# Patient Record
Sex: MALE | Race: ASIAN | Employment: FULL TIME | URBAN - METROPOLITAN AREA
[De-identification: names, ages, dates, MRNs, and addresses within clinical notes are randomized per-mention and may not be internally consistent; named-entity substitution may affect disease eponyms.]

---

## 2020-06-17 ENCOUNTER — TELEPHONE (OUTPATIENT)
Dept: SURGERY | Facility: CLINIC | Age: 30
End: 2020-06-17

## 2020-06-17 NOTE — TELEPHONE ENCOUNTER
[facility] Call Center    Phone Message    May a detailed message be left on voicemail: yes     Reason for Call: Appointment Intake    Referring Provider Name: self referral   Diagnosis and/or Symptoms: Hemorrhoids - wants sooner than first available in August due to pain    Action Taken: Message routed to:  [facility] ([facility] M[facility]    Travel Screening: Not Applicable

## 2020-06-17 NOTE — TELEPHONE ENCOUNTER
Assessed symptoms per pt worsening burning pain 1 hour after BM. Told pt to drink plenty of fluids and start a daily fiber supplement. Reports no bleeding

## 2020-06-18 NOTE — TELEPHONE ENCOUNTER
RECORDS RECEIVED FROM: Self referral    DATE RECEIVED: 7/1/20 9:15AM   NOTES STATUS DETAILS   OFFICE NOTE from referring provider  N/A Self referral

## 2020-07-01 ENCOUNTER — PRE VISIT (OUTPATIENT)
Dept: SURGERY | Facility: CLINIC | Age: 30
End: 2020-07-01

## 2020-07-01 ENCOUNTER — OFFICE VISIT (OUTPATIENT)
Dept: SURGERY | Facility: CLINIC | Age: 30
End: 2020-07-01
Payer: COMMERCIAL

## 2020-07-01 VITALS
SYSTOLIC BLOOD PRESSURE: 159 MMHG | BODY MASS INDEX: 28.19 KG/M2 | RESPIRATION RATE: 18 BRPM | DIASTOLIC BLOOD PRESSURE: 103 MMHG | WEIGHT: 186 LBS | HEIGHT: 68 IN | OXYGEN SATURATION: 98 % | HEART RATE: 87 BPM | TEMPERATURE: 98.8 F

## 2020-07-01 DIAGNOSIS — K64.8 HEMORRHOID PROLAPSE: Primary | ICD-10-CM

## 2020-07-01 DIAGNOSIS — K59.09 OTHER CONSTIPATION: ICD-10-CM

## 2020-07-01 RX ORDER — ZINC GLUCONATE 50 MG
50 TABLET ORAL DAILY
COMMUNITY

## 2020-07-01 SDOH — HEALTH STABILITY: MENTAL HEALTH: HOW OFTEN DO YOU HAVE A DRINK CONTAINING ALCOHOL?: MONTHLY OR LESS

## 2020-07-01 ASSESSMENT — MIFFLIN-ST. JEOR: SCORE: 1778.19

## 2020-07-01 ASSESSMENT — PAIN SCALES - GENERAL: PAINLEVEL: NO PAIN (0)

## 2020-07-01 NOTE — PATIENT INSTRUCTIONS
1. Start a daily fiber supplement such as Citrucel or Metamucil. Start with once a day and slowly increase up to three times a day, if needed, over the next 4-6 weeks  2. In addition can add in Miralax daily for continued hard stools  3. If prolapsing hemorrhoids recur, can return to clinic for possible hemorrhoid banding

## 2020-07-01 NOTE — PROGRESS NOTES
"Colon and Rectal Surgery Consult Clinic Note    Date: 2020     Referring provider:  Referred Self, MD  No address on file     RE: Ruben Clifton  : 1990  JOHN: 2020    Ruben Clifton is a very pleasant 29 year old male who presents today for hemorrhoids.    HPI:  Ruben reports that about 2 weeks ago he had a some protrusion of tissue from his anus with bowel movements.  He had some burning with this but no significant pain.  No bleeding.  He was having harder bowel movements at the time due to change in his diet.  He has since been using a fiber supplement every few days and use over-the-counter suppositories for a week and his symptoms have now resolved.    Physical Examination: Exam was chaperoned by Sam Canales EMT   BP (!) 159/103 (BP Location: Right arm, Patient Position: Sitting, Cuff Size: Adult Regular)   Pulse 87   Temp 98.8  F (37.1  C)   Resp 18   Ht 5' 8\"   Wt 186 lb   SpO2 98%   BMI 28.28 kg/m    General: Alert, oriented, in no acute distress, sitting comfortably  HEENT: Mucous membranes moist  Perianal external examination:  Perianal skin: Intact with no excoriation or lichenification.  Lesions: No evidence of an external lesion, nodularity, or induration in the perianal region.  Eversion of buttocks: There was not evidence of an anal fissure. Details: N/A.  Skin tags or external hemorrhoids: None.  Digital rectal examination: Was performed.   Sphincter tone: Good.  Palpable lesions: No.  Prostate: Normal.  Other: None.    Anoscopy: Was performed.   Hemorrhoids: Yes.  Grade 2-3 internal hemorrhoids without active bleeding  Lesions: No    Assessment/Plan: 29 year old male with constipation and hemorrhoid prolapse.  Since starting on a fiber supplement and using over-the-counter hemorrhoid suppositories, his symptoms have resolved.  He does have grade 2-3 internal hemorrhoids on exam and discussed that if his symptoms become recurrent that we could consider hemorrhoid banding.  " "However, if asymptomatic, no further intervention is needed.  Discussed the importance of long-term management of constipation and advised that he use a daily fiber supplement and can add in a laxative in addition as needed.  He can follow-up with our clinic as needed.    Medical history:  No past medical history on file.    Surgical history:  No past surgical history on file.    Problem list:  There are no active problems to display for this patient.      Medications:  Current Outpatient Medications   Medication Sig Dispense Refill     zinc gluconate 50 MG tablet Take 50 mg by mouth daily         Allergies:  Allergies no known allergies    Family history:  No family history on file.    Social history:  Social History     Tobacco Use     Smoking status: Never Smoker     Smokeless tobacco: Never Used   Substance Use Topics     Alcohol use: Yes     Frequency: Monthly or less    Marital status: .  Occupation: works for [employer].    Nursing Notes:   Zeyad Canales, EMT  7/1/2020  9:29 AM  Signed  Chief Complaint   Patient presents with     Consult     Pt here for consult for pain post BM       Vitals:    07/01/20 0922 07/01/20 0925   BP: (!) 150/113 (!) 159/103   BP Location: Left arm Right arm   Patient Position: Sitting Sitting   Cuff Size: Adult Regular Adult Regular   Pulse: 87    Resp: 18    Temp: 98.8  F (37.1  C)    SpO2: 98%    Weight: 84.4 kg (186 lb)    Height: 1.727 m (5' 8\")        Body mass index is 28.28 kg/m .      LASHANDA Jeffers NREMT                         Total face to face time was 20 minutes, >50% counseling.    SHAMEKA Schneider, NP-C  Colon and Rectal Surgery   [facility]    This note was created using speech recognition software and may contain unintended word substitutions.    "

## 2020-07-01 NOTE — LETTER
Date:July 21, 2020      Patient was self referred, no letter generated. Do not send.        [facility] Information

## 2020-07-01 NOTE — NURSING NOTE
"Chief Complaint   Patient presents with     Consult     Pt here for consult for pain post BM       Vitals:    07/01/20 0922 07/01/20 0925   BP: (!) 150/113 (!) 159/103   BP Location: Left arm Right arm   Patient Position: Sitting Sitting   Cuff Size: Adult Regular Adult Regular   Pulse: 87    Resp: 18    Temp: 98.8  F (37.1  C)    SpO2: 98%    Weight: 84.4 kg (186 lb)    Height: 1.727 m (5' 8\")        Body mass index is 28.28 kg/m .      LASHANDA JeffersT                      "

## 2020-07-01 NOTE — LETTER
"2020       RE: Ruben Clifton  [address]  [place] [address]     Dear Colleague,    Thank you for referring your patient, Ruben Clifton, to the [facility] COLON AND RECTAL SURGERY at [facility]. Please see a copy of my visit note below.    Colon and Rectal Surgery Consult Clinic Note    Date: 2020     Referring provider:  Referred Self, MD  No address on file     RE: Ruben Clifton  : 1990  JOHN: 2020    Ruben Clifton is a very pleasant 29 year old male who presents today for hemorrhoids.    HPI:  Ruben reports that about 2 weeks ago he had a some protrusion of tissue from his anus with bowel movements.  He had some burning with this but no significant pain.  No bleeding.  He was having harder bowel movements at the time due to change in his diet.  He has since been using a fiber supplement every few days and use over-the-counter suppositories for a week and his symptoms have now resolved.    Physical Examination: Exam was chaperoned by Sam Canales, EMT   BP (!) 159/103 (BP Location: Right arm, Patient Position: Sitting, Cuff Size: Adult Regular)   Pulse 87   Temp 98.8  F (37.1  C)   Resp 18   Ht 5' 8\"   Wt 186 lb   SpO2 98%   BMI 28.28 kg/m    General: Alert, oriented, in no acute distress, sitting comfortably  HEENT: Mucous membranes moist  Perianal external examination:  Perianal skin: Intact with no excoriation or lichenification.  Lesions: No evidence of an external lesion, nodularity, or induration in the perianal region.  Eversion of buttocks: There was not evidence of an anal fissure. Details: N/A.  Skin tags or external hemorrhoids: None.  Digital rectal examination: Was performed.   Sphincter tone: Good.  Palpable lesions: No.  Prostate: Normal.  Other: None.    Anoscopy: Was performed.   Hemorrhoids: Yes.  Grade 2-3 internal hemorrhoids without active bleeding  Lesions: No    Assessment/Plan: 29 year old male with constipation and " "hemorrhoid prolapse.  Since starting on a fiber supplement and using over-the-counter hemorrhoid suppositories, his symptoms have resolved.  He does have grade 2-3 internal hemorrhoids on exam and discussed that if his symptoms become recurrent that we could consider hemorrhoid banding.  However, if asymptomatic, no further intervention is needed.  Discussed the importance of long-term management of constipation and advised that he use a daily fiber supplement and can add in a laxative in addition as needed.  He can follow-up with our clinic as needed.    Medical history:  No past medical history on file.    Surgical history:  No past surgical history on file.    Problem list:  There are no active problems to display for this patient.      Medications:  Current Outpatient Medications   Medication Sig Dispense Refill     zinc gluconate 50 MG tablet Take 50 mg by mouth daily         Allergies:  Allergies no known allergies    Family history:  No family history on file.    Social history:  Social History     Tobacco Use     Smoking status: Never Smoker     Smokeless tobacco: Never Used   Substance Use Topics     Alcohol use: Yes     Frequency: Monthly or less    Marital status: .  Occupation: works for [employer].    Nursing Notes:   Zeyad Canales, EMT  7/1/2020  9:29 AM  Signed  Chief Complaint   Patient presents with     Consult     Pt here for consult for pain post BM       Vitals:    07/01/20 0922 07/01/20 0925   BP: (!) 150/113 (!) 159/103   BP Location: Left arm Right arm   Patient Position: Sitting Sitting   Cuff Size: Adult Regular Adult Regular   Pulse: 87    Resp: 18    Temp: 98.8  F (37.1  C)    SpO2: 98%    Weight: 84.4 kg (186 lb)    Height: 1.727 m (5' 8\")        Body mass index is 28.28 kg/m .      LASHANDA Jeffers NREMT                         Total face to face time was 20 minutes, >50% counseling.    SHAMEKA Schneider, NP-C  Colon and Rectal Surgery   [facility]    This note was created using speech recognition software and may contain unintended word substitutions.      Again, thank you for allowing me to participate in the care of your patient.      Sincerely,    SHAMEKA Molina CNP

## 2021-03-25 ENCOUNTER — OFFICE VISIT (OUTPATIENT)
Dept: SURGERY | Facility: CLINIC | Age: 31
End: 2021-03-25
Payer: COMMERCIAL

## 2021-03-25 VITALS
SYSTOLIC BLOOD PRESSURE: 149 MMHG | HEIGHT: 68 IN | WEIGHT: 185.5 LBS | BODY MASS INDEX: 28.11 KG/M2 | OXYGEN SATURATION: 100 % | DIASTOLIC BLOOD PRESSURE: 92 MMHG | TEMPERATURE: 98.4 F | HEART RATE: 73 BPM

## 2021-03-25 DIAGNOSIS — K64.8 INTERNAL HEMORRHOIDS: Primary | ICD-10-CM

## 2021-03-25 PROCEDURE — 99212 OFFICE O/P EST SF 10 MIN: CPT | Performed by: NURSE PRACTITIONER

## 2021-03-25 ASSESSMENT — PAIN SCALES - GENERAL: PAINLEVEL: NO PAIN (0)

## 2021-03-25 ASSESSMENT — MIFFLIN-ST. JEOR: SCORE: 1770.92

## 2021-03-25 NOTE — LETTER
Date:June 5, 2021      Patient was self referred, no letter generated. Do not send.        [facility] Health Information

## 2021-03-25 NOTE — PROGRESS NOTES
"Colon and Rectal Surgery Follow-Up Clinic Note    RE: Ruben Clifton  : 1990  JOHN: 3/25/2021    Ruben Clifton is a very pleasant 30 year old male here for follow-up of hemorrhoids.    Interval history: I saw Mr. Clifton last year with hemorrhoid prolapse but symptoms had resolved so no intervention at that time. He was doing well but about 3-4 months after he saw me he had a more minor episode of some hemorrhoid prolapse that resolved again. He is on daily fiber and bowel movements are normal. No further hemorrhoid symptoms recently but he would like them looked at to ensure things are going okay.    Physical Examination: Exam was chaperoned by Crystal Aly MA   BP (!) 149/92 (BP Location: Left arm, Patient Position: Sitting, Cuff Size: Adult Regular)   Pulse 73   Temp 98.4  F (36.9  C) (Oral)   Ht 5' 8\"   Wt 185 lb 8 oz   SpO2 100%   BMI 28.21 kg/m    General: alert, oriented, in no acute distress, sitting comfortably  HEENT: mucous membranes moist  Perianal external examination:  Perianal skin: Intact with no excoriation or lichenification.  Lesions: No evidence of an external lesion, nodularity, or induration in the perianal region.  Eversion of buttocks: There was not evidence of an anal fissure. Details: N/A.  Skin tags or external hemorrhoids: None.    Digital rectal examination: Was performed.   Sphincter tone: Good.  Palpable lesions: No.  Prostate: Normal.  Other: None..    Anoscopy: Was performed.   Hemorrhoids: small internal hemorrhoids without bleeding.  Lesions: No.    Assessment/Plan: 30 year old male with with small internal hemorrhoids.  He has had some prolapsing in the past but none recently.  Recommended continuing on daily fiber supplement and he can return for any return of symptoms. Patient's questions were answered to his stated satisfaction and he is in agreement with this plan.      Medical history:  No past medical history on file.    Surgical history:  No past surgical " "history on file.    Problem list:  There are no active problems to display for this patient.      Medications:  Current Outpatient Medications   Medication Sig Dispense Refill     Polyethylene Glycol 3350 (MIRALAX PO)        zinc gluconate 50 MG tablet Take 50 mg by mouth daily         Allergies:  No Known Allergies    Family history:  No family history on file.    Social history:  Social History     Tobacco Use     Smoking status: Never Smoker     Smokeless tobacco: Never Used   Substance Use Topics     Alcohol use: Yes     Frequency: Monthly or less     Marital status: .    Nursing Notes:   Crystal Clayton CMA  3/25/2021  7:56 AM  Signed  Chief Complaint   Patient presents with     Follow Up     Follow up for hemorrhoids       Vitals:    03/25/21 0744   BP: (!) 149/92   BP Location: Left arm   Patient Position: Sitting   Cuff Size: Adult Regular   Pulse: 73   Temp: 98.4  F (36.9  C)   TempSrc: Oral   SpO2: 100%   Weight: 185 lb 8 oz   Height: 5' 8\"       Body mass index is 28.21 kg/m .             Crystal Delgado CMA         10 minutes spent on the date of the encounter doing chart review, history and exam, documentation and further activities as noted above.     Jerri Mendoza NP-C  Colon and Rectal Surgery  [facility]    "

## 2021-03-25 NOTE — LETTER
"3/25/2021       RE: Ruben Clifton  [address] [place] [address]     Dear Colleague,    Thank you for referring your patient, Ruben Clifton, to the [facility] COLON AND RECTAL SURGERY CLINIC [place] [facility]. Please see a copy of my visit note below.    Colon and Rectal Surgery Follow-Up Clinic Note    RE: Ruben Clifton  : 1990  JOHN: 3/25/2021    Ruben Clifton is a very pleasant 30 year old male here for follow-up of hemorrhoids.    Interval history: I saw Mr. Clifton last year with hemorrhoid prolapse but symptoms had resolved so no intervention at that time. He was doing well but about 3-4 months after he saw me he had a more minor episode of some hemorrhoid prolapse that resolved again. He is on daily fiber and bowel movements are normal. No further hemorrhoid symptoms recently but he would like them looked at to ensure things are going okay.    Physical Examination: Exam was chaperoned by Crystal Aly MA   BP (!) 149/92 (BP Location: Left arm, Patient Position: Sitting, Cuff Size: Adult Regular)   Pulse 73   Temp 98.4  F (36.9  C) (Oral)   Ht 5' 8\"   Wt 185 lb 8 oz   SpO2 100%   BMI 28.21 kg/m    General: alert, oriented, in no acute distress, sitting comfortably  HEENT: mucous membranes moist  Perianal external examination:  Perianal skin: Intact with no excoriation or lichenification.  Lesions: No evidence of an external lesion, nodularity, or induration in the perianal region.  Eversion of buttocks: There was not evidence of an anal fissure. Details: N/A.  Skin tags or external hemorrhoids: None.    Digital rectal examination: Was performed.   Sphincter tone: Good.  Palpable lesions: No.  Prostate: Normal.  Other: None..    Anoscopy: Was performed.   Hemorrhoids: small internal hemorrhoids without bleeding.  Lesions: No.    Assessment/Plan: 30 year old male with with small internal hemorrhoids.  He has had some prolapsing " "in the past but none recently.  Recommended continuing on daily fiber supplement and he can return for any return of symptoms. Patient's questions were answered to his stated satisfaction and he is in agreement with this plan.      Medical history:  No past medical history on file.    Surgical history:  No past surgical history on file.    Problem list:  There are no active problems to display for this patient.      Medications:  Current Outpatient Medications   Medication Sig Dispense Refill     Polyethylene Glycol 3350 (MIRALAX PO)        zinc gluconate 50 MG tablet Take 50 mg by mouth daily         Allergies:  No Known Allergies    Family history:  No family history on file.    Social history:  Social History     Tobacco Use     Smoking status: Never Smoker     Smokeless tobacco: Never Used   Substance Use Topics     Alcohol use: Yes     Frequency: Monthly or less     Marital status: .    Nursing Notes:   Crystal Clayton CMA  3/25/2021  7:56 AM  Signed  Chief Complaint   Patient presents with     Follow Up     Follow up for hemorrhoids       Vitals:    03/25/21 0744   BP: (!) 149/92   BP Location: Left arm   Patient Position: Sitting   Cuff Size: Adult Regular   Pulse: 73   Temp: 98.4  F (36.9  C)   TempSrc: Oral   SpO2: 100%   Weight: 185 lb 8 oz   Height: 5' 8\"       Body mass index is 28.21 kg/m .             Crystal Delgado CMA         10 minutes spent on the date of the encounter doing chart review, history and exam, documentation and further activities as noted above.     Jerri Beck NP-C  Colon and Rectal Surgery  [facility]        Again, thank you for allowing me to participate in the care of your patient.      Sincerely,    Jerri Beck, APRN CNP      "

## 2021-03-25 NOTE — NURSING NOTE
"Chief Complaint   Patient presents with     Follow Up     Follow up for hemorrhoids       Vitals:    03/25/21 0744   BP: (!) 149/92   BP Location: Left arm   Patient Position: Sitting   Cuff Size: Adult Regular   Pulse: 73   Temp: 98.4  F (36.9  C)   TempSrc: Oral   SpO2: 100%   Weight: 185 lb 8 oz   Height: 5' 8\"       Body mass index is 28.21 kg/m .             Cyrstal Delgado CMA    "

## 2021-10-10 ENCOUNTER — HEALTH MAINTENANCE LETTER (OUTPATIENT)
Age: 31
End: 2021-10-10

## 2022-09-24 ENCOUNTER — HEALTH MAINTENANCE LETTER (OUTPATIENT)
Age: 32
End: 2022-09-24

## 2023-01-29 ENCOUNTER — HEALTH MAINTENANCE LETTER (OUTPATIENT)
Age: 33
End: 2023-01-29

## 2024-02-25 ENCOUNTER — HEALTH MAINTENANCE LETTER (OUTPATIENT)
Age: 34
End: 2024-02-25